# Patient Record
Sex: MALE | Race: WHITE | Employment: FULL TIME | URBAN - METROPOLITAN AREA
[De-identification: names, ages, dates, MRNs, and addresses within clinical notes are randomized per-mention and may not be internally consistent; named-entity substitution may affect disease eponyms.]

---

## 2022-01-01 ENCOUNTER — HOSPITAL ENCOUNTER (EMERGENCY)
Age: 27
Discharge: HOME | End: 2022-01-01
Attending: EMERGENCY MEDICINE
Payer: MEDICAID

## 2022-01-01 ENCOUNTER — APPOINTMENT (OUTPATIENT)
Dept: CT IMAGING | Age: 27
End: 2022-01-01
Attending: EMERGENCY MEDICINE
Payer: MEDICAID

## 2022-01-01 ENCOUNTER — APPOINTMENT (OUTPATIENT)
Dept: GENERAL RADIOLOGY | Age: 27
End: 2022-01-01
Attending: EMERGENCY MEDICINE
Payer: MEDICAID

## 2022-01-01 VITALS
SYSTOLIC BLOOD PRESSURE: 131 MMHG | DIASTOLIC BLOOD PRESSURE: 79 MMHG | HEART RATE: 127 BPM | OXYGEN SATURATION: 98 % | RESPIRATION RATE: 19 BRPM | TEMPERATURE: 98.1 F

## 2022-01-01 DIAGNOSIS — S02.609A CLOSED FRACTURE OF LEFT SIDE OF MANDIBLE, UNSPECIFIED MANDIBULAR SITE, INITIAL ENCOUNTER (HCC): ICD-10-CM

## 2022-01-01 DIAGNOSIS — M25.512 PAIN IN JOINT OF LEFT SHOULDER: ICD-10-CM

## 2022-01-01 DIAGNOSIS — S00.83XA CONTUSION OF FACE, INITIAL ENCOUNTER: Primary | ICD-10-CM

## 2022-01-01 LAB
COMMENT, HOLDF: NORMAL
SAMPLES BEING HELD,HOLD: NORMAL

## 2022-01-01 PROCEDURE — 73030 X-RAY EXAM OF SHOULDER: CPT

## 2022-01-01 PROCEDURE — 74011250636 HC RX REV CODE- 250/636: Performed by: EMERGENCY MEDICINE

## 2022-01-01 PROCEDURE — 70450 CT HEAD/BRAIN W/O DYE: CPT

## 2022-01-01 PROCEDURE — 72125 CT NECK SPINE W/O DYE: CPT

## 2022-01-01 PROCEDURE — 99285 EMERGENCY DEPT VISIT HI MDM: CPT

## 2022-01-01 PROCEDURE — 71046 X-RAY EXAM CHEST 2 VIEWS: CPT

## 2022-01-01 RX ADMIN — SODIUM CHLORIDE 1000 ML: 9 INJECTION, SOLUTION INTRAVENOUS at 07:25

## 2022-01-01 NOTE — ED TRIAGE NOTES
Pt had GLF during police angelica, states left shoulder pain, facial abrasions noted, otherwise not answering questions. Per police, pt was found unconscious on the side of the round.

## 2022-01-01 NOTE — ED NOTES
Bedside and Verbal shift change report given to [employer]. Report included the following information SBAR, ED Summary, Intake/Output, MAR and Recent Results.

## 2022-01-01 NOTE — ED NOTES
Pt. Was given a pair of paper scrubs. Dr. Zabala Pro in to update pt. On discharge status. Yaw FARIA at bedside.

## 2022-01-01 NOTE — ED NOTES
Pt ambulated out of ED with discharge instructions and prescriptions in hand given by Dr. Portillo Ragland; pt verbalized understanding of discharge paperwork and time allotted for questions. VSS. Pt alert and oriented.  With police

## 2022-01-01 NOTE — ED PROVIDER NOTES
Mr. Nuno is a 33yo male who presents to the ER with complaints of pain. Apparently, he was in handcuffs and ran from the police. He slipped on wet pavement and fell onto his left side. He said that he hit his left shoulder and his left face. He complains of pain at these areas. He has chronic low back pain, however, his back pain is no different than his baseline. He denies any chest pain or abdominal pain. No numbness, tingling, or weakness. He denies any other complaints. History reviewed. No pertinent past medical history. History reviewed. No pertinent surgical history. History reviewed. No pertinent family history. Social History     Socioeconomic History    Marital status: SINGLE     Spouse name: Not on file    Number of children: Not on file    Years of education: Not on file    Highest education level: Not on file   Occupational History    Not on file   Tobacco Use    Smoking status: Never Smoker    Smokeless tobacco: Never Used   Substance and Sexual Activity    Alcohol use: Yes    Drug use: Yes     Types: Cocaine, Heroin    Sexual activity: Not on file   Other Topics Concern    Not on file   Social History Narrative    Not on file     Social Determinants of Health     Financial Resource Strain:     Difficulty of Paying Living Expenses: Not on file   Food Insecurity:     Worried About Running Out of Food in the Last Year: Not on file    Judy of Food in the Last Year: Not on file   Transportation Needs:     Lack of Transportation (Medical): Not on file    Lack of Transportation (Non-Medical):  Not on file   Physical Activity:     Days of Exercise per Week: Not on file    Minutes of Exercise per Session: Not on file   Stress:     Feeling of Stress : Not on file   Social Connections:     Frequency of Communication with Friends and Family: Not on file    Frequency of Social Gatherings with Friends and Family: Not on file    Attends Worship Services: Not on file   [address] [facility] [employer] or Organizations: Not on file    Attends Club or Organization Meetings: Not on file    Marital Status: Not on file   Intimate Partner Violence:     Fear of Current or Ex-Partner: Not on file    Emotionally Abused: Not on file    Physically Abused: Not on file    Sexually Abused: Not on file   Housing Stability:     Unable to Pay for Housing in the Last Year: Not on file    Number of [place] in the Last Year: Not on file    Unstable Housing in the Last Year: Not on file         ALLERGIES: Patient has no known allergies. Review of Systems   Constitutional: Negative for chills and fever. HENT: Negative for rhinorrhea and sore throat. Respiratory: Negative for cough and shortness of breath. Cardiovascular: Negative for chest pain. Gastrointestinal: Negative for abdominal pain, diarrhea, nausea and vomiting. Genitourinary: Negative for dysuria and hematuria. Musculoskeletal:        Shoulder pain   Skin: Negative for pallor and rash. Neurological: Positive for headaches. Negative for dizziness, weakness and light-headedness. All other systems reviewed and are negative. Vitals:    01/01/22 0709   BP: 131/79   Pulse: (!) 127   Resp: 19   Temp: 98.1 °F (36.7 °C)   SpO2: 98%            Physical Exam     Vital signs reviewed. Nursing notes reviewed. Const:  No acute distress, well developed, well nourished  Head: Abrasions to left cheekbone, lower lip, and right upper lip  HEENT:  Pt.  An open his mouth without pain or difficulty, no trouble swallowing or breathing, no lacerations or wounds inside the mouth, no tenderness to the mandible from inside the mouth  Eyes:  PERRL, conjunctiva normal, no scleral icterus  Neck:  Supple, trachea midline  Cardiovascular: Tachycardic  Resp:  No resp distress, no increased work of breathing  Abd:  Soft, non-tender, non-distended  MSK:  No pedal edema, normal ROM, no midline C, T, or L-spine tenderness, mild tenderness to palpation of the left shoulder, moderate tenderness over the left AC joint  Neuro:  Alert and oriented x3, no cranial nerve defect  Skin: Abrasion to left hip, and face  Psych: normal mood and affect, behavior is normal, judgement and thought content is normal          MDM  Number of Diagnoses or Management Options     Amount and/or Complexity of Data Reviewed  Clinical lab tests: ordered and reviewed  Tests in the radiology section of CPT®: ordered and reviewed  Review and summarize past medical records: yes    Patient Progress  Patient progress: stable          Mr. Nuno is a 31yo man who presents to the ER after a fall. Pt. Does have tenderness over his left AC joint. I will place him in a sling. If he is still having pain, then he needs to f/u with ortho. Pt.'s CT showed what appeared to be an old mandibular fracture. He does have an abrasion over his left cheekbone but no abrasions to his mandible on the left. He can open his mouth on the left without pain. He does have diffuse pain on his entire left face to palpation. The CT shows signs that the mandibular fracture is old. No lacerations at the site of the fracture or over the entire face. Pt. To f/u with ENT for this. Again, it does seem to be a chronic fracture. Pt. To return to the ER with new or worsening sx.       Procedures

## 2023-05-12 RX ORDER — ONDANSETRON 4 MG/1
TABLET, ORALLY DISINTEGRATING ORAL EVERY 6 HOURS PRN
COMMUNITY
Start: 2013-12-02